# Patient Record
Sex: MALE | Race: WHITE | URBAN - METROPOLITAN AREA
[De-identification: names, ages, dates, MRNs, and addresses within clinical notes are randomized per-mention and may not be internally consistent; named-entity substitution may affect disease eponyms.]

---

## 2017-08-03 ENCOUNTER — EMERGENCY (EMERGENCY)
Facility: HOSPITAL | Age: 27
LOS: 1 days | End: 2017-08-03

## 2017-08-03 DIAGNOSIS — S89.92XA UNSPECIFIED INJURY OF LEFT LOWER LEG, INITIAL ENCOUNTER: ICD-10-CM
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
## 2017-08-03 DIAGNOSIS — M79.642 PAIN IN LEFT HAND: ICD-10-CM

## 2017-08-03 DIAGNOSIS — M25.562 PAIN IN LEFT KNEE: ICD-10-CM
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
## 2019-07-26 ENCOUNTER — EMERGENCY (EMERGENCY)
Facility: HOSPITAL | Age: 29
LOS: 0 days | Discharge: HOME | End: 2019-07-26
Admitting: EMERGENCY MEDICINE
Payer: COMMERCIAL

## 2019-07-26 VITALS
DIASTOLIC BLOOD PRESSURE: 85 MMHG | HEART RATE: 75 BPM | OXYGEN SATURATION: 97 % | TEMPERATURE: 97 F | RESPIRATION RATE: 18 BRPM | SYSTOLIC BLOOD PRESSURE: 138 MMHG

## 2019-07-26 VITALS
RESPIRATION RATE: 18 BRPM | OXYGEN SATURATION: 98 % | DIASTOLIC BLOOD PRESSURE: 72 MMHG | HEART RATE: 73 BPM | TEMPERATURE: 98 F | SYSTOLIC BLOOD PRESSURE: 126 MMHG

## 2019-07-26 DIAGNOSIS — T74.11XA ADULT PHYSICAL ABUSE, CONFIRMED, INITIAL ENCOUNTER: ICD-10-CM
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
## 2019-07-26 DIAGNOSIS — S09.90XA UNSPECIFIED INJURY OF HEAD, INITIAL ENCOUNTER: ICD-10-CM
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
## 2019-07-26 PROCEDURE — 99283 EMERGENCY DEPT VISIT LOW MDM: CPT

## 2019-07-26 NOTE — ED PROVIDER NOTE - NSFOLLOWUPINSTRUCTIONS_ED_ALL_ED_FT
Closed Head Injury    Closed head injury in an injury to your head that may or may not involve a traumatic brain injury (TBI). Symptoms of TBI can be short or long lasting and include headache, dizziness, interference with memory or speech, fatigue, confusion, changes in sleep, mood changes, nausea, depression/anxiety, and dulling of senses. Make sure to obtain proper rest which includes getting plenty of sleep, avoiding excessive visual stimulation, and avoiding activities that may cause physical or mental stress. Avoid any situation where there is potential for another head injury including sports.    SEEK MEDICAL CARE IF YOU HAVE THE FOLLOWING SYMPTOMS: unusual drowsiness, vomiting, severe dizziness, seizures, lightheadedness, muscular weakness, different pupil sizes, visual changes, or clear or bloody discharge from your ears or nose.  Concussion    WHAT YOU NEED TO KNOW:    A concussion is a mild brain injury. It is usually caused by a bump or blow to the head from a fall, a motor vehicle crash, or a sports injury. Sometimes being shaken forcefully may cause a concussion.    DISCHARGE INSTRUCTIONS:    Have someone call 911 for any of the following:     Someone tries to wake you and cannot do so.      You have a seizure, increasing confusion, or a change in personality.      Your speech becomes slurred, or you have new vision problems.    Return to the emergency department if:     You have sudden and new vision problems.      You have a severe headache that does not go away.      You have arm or leg weakness, numbness, or new problems with coordination.      You have blood or clear fluid coming out of the ears or nose.    Contact your healthcare provider if:     You have nausea or are vomiting.      You feel more sleepy than usual.      Your symptoms get worse.      Your symptoms last longer than 6 weeks after the injury.      You have questions or concerns about your condition or care.    Medicines: You may need any of the following:     Acetaminophen decreases pain and fever. It is available without a doctor's order. Ask how much to take and how often to take it. Follow directions. Read the labels of all other medicines you are using to see if they also contain acetaminophen, or ask your doctor or pharmacist. Acetaminophen can cause liver damage if not taken correctly. Do not use more than 4 grams (4,000 milligrams) total of acetaminophen in one day.       NSAIDs help decrease swelling and pain or fever. This medicine is available with or without a doctor's order. NSAIDs can cause stomach bleeding or kidney problems in certain people. If you take blood thinner medicine, always ask your healthcare provider if NSAIDs are safe for you. Always read the medicine label and follow directions.      Take your medicine as directed. Contact your healthcare provider if you think your medicine is not helping or if you have side effects. Tell him or her if you are allergic to any medicine. Keep a list of the medicines, vitamins, and herbs you take. Include the amounts, and when and why you take them. Bring the list or the pill bottles to follow-up visits. Carry your medicine list with you in case of an emergency.    Self-care: Concussion symptoms usually go away within about 10 days, but they may last longer. The following may be recommended to manage your symptoms:     Rest from physical and mental activities as directed. Mental activities are those that require thinking, concentration, and attention. You will need to rest until your symptoms are gone. Rest will allow you to recover from your concussion. Ask your healthcare provider when you can return to work and other daily activities.      Have someone stay with you for the first 24 hours after your injury. Your healthcare provider should be contacted if your symptoms get worse, or you develop new symptoms.      Do not participate in sports and physical activities until your healthcare provider says it is okay. They could make your symptoms worse or lead to another concussion. Your healthcare provider will tell you when it is okay for you to return to sports or physical activities. Ask for more information about sports concussions.    Prevent another concussion:     Wear protective sports equipment that fits properly. Helmets help decrease your risk for a serious brain injury. Talk to your healthcare provider about ways you can decrease your risk for a concussion if you play sports.      Wear your seatbelt every time you travel. This helps to decrease your risk for a head injury if you are in a car accident.     Follow up with your healthcare provider as directed: Write down your questions so you remember to ask them during your visits.        © Copyright [employer] 2019 All illustrations and images included in CareNotes are the copyrighted property of [employer] or "[employer]".
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
## 2019-07-26 NOTE — ED PROVIDER NOTE - PHYSICAL EXAMINATION
CONSTITUTIONAL: Well-appearing; well-nourished; in no apparent distress.   EYES: PERRL; EOM intact.   ENT: normal nose; no rhinorrhea; normal pharynx with no tonsillar hypertrophy.   NECK: Supple; non-tender; no cervical lymphadenopathy. No JVD.   CARDIOVASCULAR: Normal S1, S2; no murmurs, rubs, or gallops.   RESPIRATORY: Normal chest excursion with respiration; breath sounds clear and equal bilaterally; no wheezes, rhonchi, or rales.  MS: No evidence of trauma or deformity. Non-tender to palpation. No scoliosis. No CVA tenderness. Normal ROM in all four extremities; non-tender to palpation; distal pulses are normal.   SKIN: Normal for age and race; warm; dry; good turgor; no apparent lesions or exudate.   NEURO/PSYCH: A & O x 4; grossly unremarkable. mood and manner are appropriate. Grooming and personal hygiene are appropriate. No apparent thoughts of harm to self or others.

## 2019-07-26 NOTE — ED PROVIDER NOTE - NSFOLLOWUPCLINICS_GEN_ALL_ED_FT
[facility] Concussion Program  Concussion Program  [address] [place] [address]   Phone: [phone]  Fax:   Follow Up Time:

## 2021-11-13 ENCOUNTER — EMERGENCY (EMERGENCY)
Facility: HOSPITAL | Age: 31
LOS: 0 days | Discharge: HOME | End: 2021-11-13
Attending: EMERGENCY MEDICINE | Admitting: EMERGENCY MEDICINE
Payer: SELF-PAY

## 2021-11-13 VITALS
OXYGEN SATURATION: 97 % | DIASTOLIC BLOOD PRESSURE: 82 MMHG | TEMPERATURE: 98 F | RESPIRATION RATE: 18 BRPM | HEART RATE: 74 BPM | WEIGHT: 190.04 LBS | SYSTOLIC BLOOD PRESSURE: 130 MMHG
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
## 2021-11-13 DIAGNOSIS — S61.512A LACERATION WITHOUT FOREIGN BODY OF LEFT WRIST, INITIAL ENCOUNTER: ICD-10-CM
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
## 2021-11-13 DIAGNOSIS — Z23 ENCOUNTER FOR IMMUNIZATION: ICD-10-CM
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
## 2021-11-13 PROBLEM — Z78.9 OTHER SPECIFIED HEALTH STATUS: Chronic | Status: ACTIVE | Noted: 2019-07-26

## 2021-11-13 PROCEDURE — 99283 EMERGENCY DEPT VISIT LOW MDM: CPT | Mod: 25

## 2021-11-13 PROCEDURE — 12001 RPR S/N/AX/GEN/TRNK 2.5CM/<: CPT

## 2021-11-13 RX ORDER — TETANUS TOXOID, REDUCED DIPHTHERIA TOXOID AND ACELLULAR PERTUSSIS VACCINE, ADSORBED 5; 2.5; 8; 8; 2.5 [IU]/.5ML; [IU]/.5ML; UG/.5ML; UG/.5ML; UG/.5ML
0.5 SUSPENSION INTRAMUSCULAR ONCE
Refills: 0 | Status: COMPLETED | OUTPATIENT
Start: 2021-11-13 | End: 2021-11-13

## 2021-11-13 RX ADMIN — TETANUS TOXOID, REDUCED DIPHTHERIA TOXOID AND ACELLULAR PERTUSSIS VACCINE, ADSORBED 0.5 MILLILITER(S): 5; 2.5; 8; 8; 2.5 SUSPENSION INTRAMUSCULAR at 14:14

## 2021-11-13 NOTE — ED PROVIDER NOTE - PATIENT PORTAL LINK FT
You can access the FollowMyHealth Patient Portal offered by [facility] by registering at the following website: [email or web address]. By joining [employer]’s FollowMyHealth portal, you will also be able to view your health information using other applications (apps) compatible with our system.

## 2021-11-13 NOTE — ED PROVIDER NOTE - NSFOLLOWUPCLINICS_GEN_ALL_ED_FT
[facility]  Medicine  [address], [place]   Phone: [phone]  Fax:   Follow Up Time: 1-3 Days

## 2021-11-13 NOTE — ED PROVIDER NOTE - CLINICAL SUMMARY MEDICAL DECISION MAKING FREE TEXT BOX
superficial lac  nv intact  no clincial violation of joint  good approximation and hemostasis  tdap updated.

## 2021-11-13 NOTE — ED PROVIDER NOTE - ATTENDING CONTRIBUTION TO CARE
30yo M with superficial L ulnar side 1cm wrist lac caught on fence while playing softball. no other injruies. no w/n/t or pain aside from site of cut. on exam, nontoxic, vss   L wrist: No swelling or deformites, Full ROM with passive motion with flexion, extension, ulnar/radial deviation. Strength 5/5 in flex/ext/ulnar dviation/radial deviation. No anatomical snuffbox ttp, or ttp over remainder of carpal bones. Motor function intact in distributions of the radial (5/5 strength in extension of the 1st digit), ulnar (finger thumb abduction), and median nerves (5/5 opposition 1st-2nd fingers). Sensation intact in radial (dorsal web between 1st and 2nd digits), median (tip of the 2nd digit), and ulnar (tip of the 5th digit). Radial and ulnar pulse 2+   1cm z shaped superficial lac to ulnar side with no muscle, tendon or joint capsule visualized.   repaired here

## 2021-11-13 NOTE — ED PROVIDER NOTE - NSFOLLOWUPINSTRUCTIONS_ED_ALL_ED_FT
Please return in 7 to 10 days to get your sutures removed. If there is any increased pain swelling redness or other concerns for infection please return for evaluation.    Laceration    A laceration is a cut that goes through all of the layers of the skin and into the tissue that is right under the skin. Some lacerations heal on their own. Others need to be closed with stitches (sutures), staples, skin adhesive strips, or skin glue. Proper laceration care minimizes the risk of infection and helps the laceration to heal better.     SEEK IMMEDIATE MEDICAL CARE IF YOU HAVE THE FOLLOWING SYMPTOMS: swelling around the wound, worsening pain, drainage from the wound, red streaking going away from your wound, inability to move finger or toe near the laceration, or discoloration of skin near the laceration.

## 2021-11-13 NOTE — ED PROVIDER NOTE - OBJECTIVE STATEMENT
31yoM w/o pmhx who present with left wrist laceration. he was playing basketball and got his left lateral wrist caught on a sharp edge of a fence. tetanus not utd. no numbness or weakness.

## 2021-11-13 NOTE — ED ADULT TRIAGE NOTE - TEMPERATURE IN CELSIUS (DEGREES C)
Attending Statement:. I saw and evaluated the patient. I discussed the patient's case with the Resident.     Luciana Rice MD         36.8

## 2021-11-13 NOTE — ED ADULT NURSE NOTE - OBJECTIVE STATEMENT
pt complaining of laceration to left wrist. states he got caught on a gate. pt has full ROM of left hand and fingers.

## 2021-11-13 NOTE — ED PROVIDER NOTE - PHYSICAL EXAMINATION
CONSTITUTIONAL: well-developed, well-nourished, in no acute distress  SKIN: warm, dry  HEAD: Normocephalic  EYES: no conjunctival erythema  ENT: no nasal discharge, airway clear  NECK: full ROM,  CARD: regular rate   RESP: normal respiratory effort  EXT: moving all extremities spontaneously, left wrist moving full ROM no pain with movement, L shaped 1.5cm laceration  NEURO: alert and oriented full strength and sensation left UE  PSYCH: cooperative, appropriate

## 2021-11-13 NOTE — ED PROVIDER NOTE - NS ED ROS FT
Constitutional: No fever   Eyes:  No visual changes  Ears:  No hearing changes  Neck: No neck pain  Cardiac:  No chest pain  Respiratory:  No SOB   GI:  No abdominal pain, nausea, or vomiting  MS:  No back pain +wrist pain  Neuro:  No headache or weakness.  No LOC  Skin:  +skin laceration

## 2021-11-20 ENCOUNTER — EMERGENCY (EMERGENCY)
Facility: HOSPITAL | Age: 31
LOS: 0 days | Discharge: HOME | End: 2021-11-20
Attending: EMERGENCY MEDICINE | Admitting: EMERGENCY MEDICINE
Payer: SELF-PAY

## 2021-11-20 VITALS
WEIGHT: 179.9 LBS | HEIGHT: 70 IN | RESPIRATION RATE: 18 BRPM | HEART RATE: 71 BPM | SYSTOLIC BLOOD PRESSURE: 132 MMHG | OXYGEN SATURATION: 97 % | DIASTOLIC BLOOD PRESSURE: 77 MMHG | TEMPERATURE: 99 F

## 2021-11-20 DIAGNOSIS — S61.512A LACERATION WITHOUT FOREIGN BODY OF LEFT WRIST, INITIAL ENCOUNTER: ICD-10-CM
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
## 2021-11-20 DIAGNOSIS — Z48.02 ENCOUNTER FOR REMOVAL OF SUTURES: ICD-10-CM
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
## 2021-11-20 PROCEDURE — L9995: CPT

## 2021-11-20 NOTE — ED PROVIDER NOTE - PHYSICAL EXAMINATION
Vital Signs: I have reviewed the initial vital signs.  Constitutional: well-nourished, appears stated age, no acute distress  Cardiovascular: regular rate, regular rhythm, well-perfused extremities  Respiratory: unlabored respiratory effort, clear to auscultation bilaterally  Gastrointestinal: soft, non-tender abdomen  Musculoskeletal: supple neck, no lower extremity edema  Integumentary: warm, dry, no rash, (+) L wrist 1.5 cm lac well healed  Neurologic: awake, alert, extremities’ motor and sensory functions grossly intact  Psychiatric: appropriate mood, appropriate affect

## 2021-11-20 NOTE — ED ADULT NURSE NOTE - NSIMPLEMENTINTERV_GEN_ALL_ED
Implemented All Universal Safety Interventions:  [place] to call system. Call bell, personal items and telephone within reach. Instruct patient to call for assistance. Room bathroom lighting operational. Non-slip footwear when patient is off stretcher. Physically safe environment: no spills, clutter or unnecessary equipment. Stretcher in lowest position, wheels locked, appropriate side rails in place.

## 2021-11-20 NOTE — ED PROVIDER NOTE - PATIENT PORTAL LINK FT
You can access the FollowMyHealth Patient Portal offered by [facility] by registering at the following website: [email or web address]. By joining [employer]’s FollowMyHealth portal, you will also be able to view your health information using other applications (apps) compatible with our system.

## 2021-12-14 PROBLEM — Z00.00 ENCOUNTER FOR PREVENTIVE HEALTH EXAMINATION: Status: ACTIVE | Noted: 2021-12-14

## 2023-08-28 DIAGNOSIS — S69.91XA UNSPECIFIED INJURY OF RIGHT WRIST, HAND AND FINGER(S), INITIAL ENCOUNTER: ICD-10-CM

## 2023-09-07 ENCOUNTER — APPOINTMENT (OUTPATIENT)
Dept: PLASTIC SURGERY | Facility: CLINIC | Age: 33
End: 2023-09-07

## 2023-09-11 NOTE — ED PROVIDER NOTE - CLINICAL SUMMARY MEDICAL DECISION MAKING FREE TEXT BOX
Gentamicin (J-Code: ) Price: 0.00 Add Ability To Adjust Fee Schedule Prices On Main Page: No Number Of Tubes Or Sticks Used?: 1 Which Photosensitizer Was Used?: Levulan Anticipated Depth And Size Of Soft Tissue Excision (Required): Subcutaneous, Less than 2 cm Anticipated Depth And Size Of Soft Tissue Excision (Required): Subcutaneous, Less than 3 cm Detail Level: None First Biopsy Type: Tangential Biopsy Number Of Lesions Injected: Less than 8 lesions First Procedure You Would Like A Quote For?: Benign Excision Location (Required): Trunk How Many Lesions Are You Destroying?: Less than 15 Fee Schedule Discount For Cash Pay Patients In % Off Of Fee Schedule: 0 Anticipated Depth And Size Of Soft Tissue Excision (Required): Subcutaneous, Less than 1.5 cm Repair: Intermediate Primary Closure Fee Schedule Discount In % Off Of Fee Schedule: Standard Fee Schedule as Entered in Pricing Tab Anticipated Wound Length (Required): 2.6-5.0 cm Anticipated Excised Diameter (Required): 1.1 - 2.0 cm Include Pathology Charges?: Yes Pt is here for suture removal from his left wrist. Healed well. Sutures removed. Will d/c.

## 2023-09-17 ENCOUNTER — EMERGENCY (EMERGENCY)
Facility: HOSPITAL | Age: 33
LOS: 0 days | Discharge: HOME | End: 2023-09-17
Attending: EMERGENCY MEDICINE
Payer: COMMERCIAL

## 2023-09-17 VITALS
RESPIRATION RATE: 18 BRPM | WEIGHT: 199.96 LBS | SYSTOLIC BLOOD PRESSURE: 132 MMHG | TEMPERATURE: 98 F | DIASTOLIC BLOOD PRESSURE: 77 MMHG | OXYGEN SATURATION: 100 % | HEART RATE: 70 BPM
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
## 2023-09-17 DIAGNOSIS — S93.401A SPRAIN OF UNSPECIFIED LIGAMENT OF RIGHT ANKLE, INITIAL ENCOUNTER: ICD-10-CM

## 2023-09-17 DIAGNOSIS — M25.571 PAIN IN RIGHT ANKLE AND JOINTS OF RIGHT FOOT: ICD-10-CM
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
## 2023-09-17 PROCEDURE — 73590 X-RAY EXAM OF LOWER LEG: CPT | Mod: RT

## 2023-09-17 PROCEDURE — 73630 X-RAY EXAM OF FOOT: CPT | Mod: RT

## 2023-09-17 PROCEDURE — 73630 X-RAY EXAM OF FOOT: CPT | Mod: 26,RT

## 2023-09-17 PROCEDURE — 73610 X-RAY EXAM OF ANKLE: CPT | Mod: RT

## 2023-09-17 PROCEDURE — 99284 EMERGENCY DEPT VISIT MOD MDM: CPT | Mod: 25

## 2023-09-17 PROCEDURE — 73610 X-RAY EXAM OF ANKLE: CPT | Mod: 26,RT

## 2023-09-17 PROCEDURE — 29515 APPLICATION SHORT LEG SPLINT: CPT | Mod: RT

## 2023-09-17 PROCEDURE — 73590 X-RAY EXAM OF LOWER LEG: CPT | Mod: 26,RT

## 2023-09-17 RX ORDER — IBUPROFEN 200 MG
600 TABLET ORAL ONCE
Refills: 0 | Status: COMPLETED | OUTPATIENT
Start: 2023-09-17 | End: 2023-09-17

## 2023-09-17 RX ADMIN — Medication 600 MILLIGRAM(S): at 15:34

## 2023-09-17 NOTE — ED PROVIDER NOTE - NSFOLLOWUPINSTRUCTIONS_ED_ALL_ED_FT
Ankle Sprain    An ankle sprain is a stretch or tear in one of the tough, fiber-like tissues (ligaments) in the ankle. The ligaments in your ankle help to hold the bones of the ankle together.     CAUSES  This condition is often caused by stepping on or falling on the outer edge of the foot.    RISK FACTORS  This condition is more likely to develop in people who play sports.    SYMPTOMS  Symptoms of this condition include:    Pain in your ankle.  Swelling.  Bruising. Bruising may develop right after you sprain your ankle or 1–2 days later.  Trouble standing or walking, especially when you turn or change directions.    DIAGNOSIS  This condition is diagnosed with a physical exam. During the exam, your health care provider will press on certain parts of your foot and ankle and try to move them in certain ways. X-rays may be taken to see how severe the sprain is and to check for broken bones.    TREATMENT  This condition may be treated with:    A brace. This is used to keep the ankle from moving until it heals.  An elastic bandage. This is used to support the ankle.  Crutches.  Pain medicine.  Surgery. This may be needed if the sprain is severe.  Physical therapy. This may help to improve the range of motion in the ankle.    HOME CARE INSTRUCTIONS  Rest your ankle.  Take over-the-counter and prescription medicines only as told by your health care provider.  For 2–3 days, keep your ankle raised (elevated) above the level of your heart as much as possible.  If directed, apply ice to the area:  Put ice in a plastic bag.  Place a towel between your skin and the bag.  Leave the ice on for 20 minutes, 2–3 times a day.  If you were given a brace:  Wear it as directed.  Remove it to shower or bathe.  Try not to move your ankle much, but wiggle your toes from time to time. This helps to prevent swelling.  If you were given an elastic bandage (dressing):  Remove it to shower or bathe.  Try not to move your ankle much, but wiggle your toes from time to time. This helps to prevent swelling.  Adjust the dressing to make it more comfortable if it feels too tight.  Loosen the dressing if you have numbness or tingling in your foot, or if your foot becomes cold and blue.  If you have crutches, use them as told by your health care provider. Continue to use them until you can walk without feeling pain in your ankle.    SEEK MEDICAL CARE IF:  You have rapidly increasing bruising or swelling.  Your pain is not relieved with medicine.    SEEK IMMEDIATE MEDICAL CARE IF:  Your toes or foot becomes numb or blue.  You have severe pain that gets worse.    ADDITIONAL NOTES AND INSTRUCTIONS    Please follow up with your Primary MD in 24-48 hr.  Seek immediate medical care for any new/worsening signs or symptoms. Our Emergency Department Referral Coordinators will be reaching out to you in the next 24-48 hours from 9:00am to 5:00pm with a follow up appointment. Please expect a phone call from the hospital in that time frame. If you do not receive a call or if you have any questions or concerns, you can reach them at   [phone].    Ankle Sprain    An ankle sprain is a stretch or tear in one of the tough, fiber-like tissues (ligaments) in the ankle. The ligaments in your ankle help to hold the bones of the ankle together.     CAUSES  This condition is often caused by stepping on or falling on the outer edge of the foot.    RISK FACTORS  This condition is more likely to develop in people who play sports.    SYMPTOMS  Symptoms of this condition include:    Pain in your ankle.  Swelling.  Bruising. Bruising may develop right after you sprain your ankle or 1–2 days later.  Trouble standing or walking, especially when you turn or change directions.    DIAGNOSIS  This condition is diagnosed with a physical exam. During the exam, your health care provider will press on certain parts of your foot and ankle and try to move them in certain ways. X-rays may be taken to see how severe the sprain is and to check for broken bones.    TREATMENT  This condition may be treated with:    A brace. This is used to keep the ankle from moving until it heals.  An elastic bandage. This is used to support the ankle.  Crutches.  Pain medicine.  Surgery. This may be needed if the sprain is severe.  Physical therapy. This may help to improve the range of motion in the ankle.    HOME CARE INSTRUCTIONS  Rest your ankle.  Take over-the-counter and prescription medicines only as told by your health care provider.  For 2–3 days, keep your ankle raised (elevated) above the level of your heart as much as possible.  If directed, apply ice to the area:  Put ice in a plastic bag.  Place a towel between your skin and the bag.  Leave the ice on for 20 minutes, 2–3 times a day.  If you were given a brace:  Wear it as directed.  Remove it to shower or bathe.  Try not to move your ankle much, but wiggle your toes from time to time. This helps to prevent swelling.  If you were given an elastic bandage (dressing):  Remove it to shower or bathe.  Try not to move your ankle much, but wiggle your toes from time to time. This helps to prevent swelling.  Adjust the dressing to make it more comfortable if it feels too tight.  Loosen the dressing if you have numbness or tingling in your foot, or if your foot becomes cold and blue.  If you have crutches, use them as told by your health care provider. Continue to use them until you can walk without feeling pain in your ankle.    SEEK MEDICAL CARE IF:  You have rapidly increasing bruising or swelling.  Your pain is not relieved with medicine.    SEEK IMMEDIATE MEDICAL CARE IF:  Your toes or foot becomes numb or blue.  You have severe pain that gets worse.    ADDITIONAL NOTES AND INSTRUCTIONS    Please follow up with your Primary MD in 24-48 hr.  Seek immediate medical care for any new/worsening signs or symptoms.

## 2023-09-17 NOTE — ED PROVIDER NOTE - CLINICAL SUMMARY MEDICAL DECISION MAKING FREE TEXT BOX
33-year-old male with no significant past medical history presents with right ankle injury after patient was playing softball around 8 AM slid onto base and hurt his right foot.  No head trauma or LOC.  Patient reports pain is increased since then, throbbing, constant, moderate intensity, worsening, better at rest associated with swelling and ecchymosis.  denies fever, chills, n/v, numbness/tingling, decreased sensation, hearing a click or feeling a pop, knee pain, hip pain, lacerations, abrasions, ecchymoses.    On Exam:  Vital Signs: I have reviewed the initial vital signs. Constitutional: Non toxic appearing pt sitting on stretcher in nad. Integumentary: No rash. No lacerations, abrasions,. (+) R lateral  swelling with dorsal ecchymoses. Cardiovascular: DP and PT pulses 2/4 b/l. Musculoskeletal: FROM of R knee in flexion, extension, Decreased ROM of R ankle in inversion and eversion, pain worse with inversion. Able to plantar and dorsi flex. Pain to palpation to R lateral ankle, (-) edema, no calf pain/swelling/erythema. No pain to palpation to knee, or hips, no pain to palpation to patella, fibular head, of base of th metatarsals. (-) Anterior and Posterior drawer tests. (-) Robertson test. No edema, no calf pain/swelling/erythema. Neurologic: AAOx3, motor 5/5 and sensation intact throughout upper and lower ext.    Plan: Pain control, xrays , reassess.     Imaging was ordered and reviewed by me.  Appropriate medications for patient's presenting complaints were ordered and effects were reassessed.  Patient's records (prior hospital, ED visit) were reviewed.  .[email or web address]  pt aware of xray, placed in splint, neurovascular intact, aware of proper use of crutches and using properly in ed, aware of signs and symptoms to return for, will follow up with ortho as discussed.

## 2023-09-17 NOTE — ED PROVIDER NOTE - OBJECTIVE STATEMENT
Patient is a 33-year-old male with no significant past medical history presenting for ankle pain. Patient states he was playing softball when he accidentally rolled his right ankle, inverting it as he slid into base. Patient states he was able to ambulate after the incident but it made his pain worse. Patient denies head trauma, LOC, dizziness, nausea, vomiting, chest pain, shortness of breath, decreased sensation to his foot, or history of bleeding disorders.

## 2023-09-17 NOTE — ED PROVIDER NOTE - ATTENDING CONTRIBUTION TO CARE
33-year-old male with no significant past medical history presents with right ankle injury after patient was playing softball around 8 AM slid onto base and hurt his right foot.  No head trauma or LOC.  Patient reports pain is increased since then, throbbing, constant, moderate intensity, worsening, better at rest associated with swelling and ecchymosis.  denies fever, chills, n/v, numbness/tingling, decreased sensation, hearing a click or feeling a pop, knee pain, hip pain, lacerations, abrasions, ecchymoses.    On Exam:  Vital Signs: I have reviewed the initial vital signs. Constitutional: Non toxic appearing pt sitting on stretcher in nad. Integumentary: No rash. No lacerations, abrasions,. (+) R lateral  swelling with dorsal ecchymoses. Cardiovascular: DP and PT pulses 2/4 b/l. Musculoskeletal: FROM of R knee in flexion, extension, Decreased ROM of R ankle in inversion and eversion, pain worse with inversion. Able to plantar and dorsi flex. Pain to palpation to R lateral ankle, (-) edema, no calf pain/swelling/erythema. No pain to palpation to knee, or hips, no pain to palpation to patella, fibular head, of base of th metatarsals. (-) Anterior and Posterior drawer tests. (-) Robertson test. No edema, no calf pain/swelling/erythema. Neurologic: AAOx3, motor 5/5 and sensation intact throughout upper and lower ext.    Plan: Pain control, xrays , reassess.

## 2023-09-17 NOTE — ED PROVIDER NOTE - PROGRESS NOTE DETAILS
ED Attending HAZEL Lambert  pt aware of xray, placed in splint, neurovascular intact, aware of proper use of crutches and using properly in ed, aware of signs and symptoms to return for, will follow up with ortho as discussed.

## 2023-09-17 NOTE — ED ADULT TRIAGE NOTE - SOURCE OF INFORMATION
Talked to Xena at [facility] AND [facility], stated on d/c  Nurse to Nurse report to be called on [phone] and HANK/AVS faxed to [phone]. Patient

## 2023-09-17 NOTE — ED PROVIDER NOTE - PHYSICAL EXAMINATION
VITAL SIGNS: I have reviewed nursing notes and confirm.  CONSTITUTIONAL: Well-appearing, non-toxic, in NAD  SKIN: Warm dry, normal skin turgor  HEAD: NCAT  EYES: No conjunctival injection, scleral anicteric  ENT: Moist mucous membranes, normal pharynx with no erythema or exudates  NECK: Supple; full ROM. Nontender. No cervical LAD  CARD: RRR, no murmurs, rubs or gallops  RESP: Clear to ausculation bilaterally.  No rales, rhonchi, or wheezing.  ABD: Soft, non-distended, non-tender, no rebound or guarding. No CVA tenderness  EXT: right foot limited ROM in dorsiflexion and plantarflexion, ecchymosis to his anterior and lateral ankle and dorsal foot, extending to his 1st toe; bony tenderness to lateral malleolus , no pedal edema, no calf tenderness  NEURO: Normal motor, normal sensory. .  PSYCH: Cooperative, appropriate.

## 2023-09-19 ENCOUNTER — APPOINTMENT (OUTPATIENT)
Dept: ORTHOPEDIC SURGERY | Facility: CLINIC | Age: 33
End: 2023-09-19

## 2024-08-10 NOTE — ED PROVIDER NOTE - PATIENT PORTAL LINK FT
0940 Pt arrived to PACU with Anesthesiologist and OR RN. AAOx4. Even, unlabored respirations. VSS. Denies pain. Denies nausea. Abdominal lap sites with dermabond x4 CDI. Ice pack applied.    0952 POC update given to Jessica, daughter, over the phone. All questions answered.     1036 Pt meets floor criteria. Report called to DELMA Griggs on Nikki 6.     1048 Pt transported to [facility] with transport. Chart and full oxygen tank with patient.      You can access the FollowMyHealth Patient Portal offered by [facility] by registering at the following website: [email or web address]. By joining [employer]’s FollowMyHealth portal, you will also be able to view your health information using other applications (apps) compatible with our system.